# Patient Record
Sex: FEMALE
[De-identification: names, ages, dates, MRNs, and addresses within clinical notes are randomized per-mention and may not be internally consistent; named-entity substitution may affect disease eponyms.]

---

## 2020-03-18 ENCOUNTER — APPOINTMENT (OUTPATIENT)
Dept: ENDOCRINOLOGY | Facility: CLINIC | Age: 60
End: 2020-03-18

## 2020-04-21 PROBLEM — Z00.00 ENCOUNTER FOR PREVENTIVE HEALTH EXAMINATION: Status: ACTIVE | Noted: 2020-04-21

## 2020-05-19 ENCOUNTER — APPOINTMENT (OUTPATIENT)
Dept: ENDOCRINOLOGY | Facility: CLINIC | Age: 60
End: 2020-05-19

## 2020-06-11 ENCOUNTER — APPOINTMENT (OUTPATIENT)
Dept: ENDOCRINOLOGY | Facility: CLINIC | Age: 60
End: 2020-06-11
Payer: MEDICAID

## 2020-06-11 VITALS
HEIGHT: 64 IN | WEIGHT: 196 LBS | HEART RATE: 84 BPM | SYSTOLIC BLOOD PRESSURE: 156 MMHG | BODY MASS INDEX: 33.46 KG/M2 | DIASTOLIC BLOOD PRESSURE: 98 MMHG

## 2020-06-11 DIAGNOSIS — E66.9 OBESITY, UNSPECIFIED: ICD-10-CM

## 2020-06-11 DIAGNOSIS — R79.89 OTHER SPECIFIED ABNORMAL FINDINGS OF BLOOD CHEMISTRY: ICD-10-CM

## 2020-06-11 PROCEDURE — 99205 OFFICE O/P NEW HI 60 MIN: CPT | Mod: 25

## 2020-06-11 PROCEDURE — 36415 COLL VENOUS BLD VENIPUNCTURE: CPT

## 2020-06-11 NOTE — HISTORY OF PRESENT ILLNESS
[FreeTextEntry1] : 60 year female pt, with Hx of thyroid nodules, referred by Dr. Ashley Louise, presents today to establish endocrine care with me.\par Other PMHx: HTN\par Denies head and neck radiation exposure during childhood. \par FHx: CA (mother), MI (father). \par Denies FHx of thyroid disorder. \par SHx: Non smoker. No EtOH use. \par 2 pregnancies; Youngest child is 29 y/o. \par \par 06/11/2020\par Pt visited her PCP for routine check-up who ordered thyroid functioning test and thyroid US, and was then referred to the endocrinologist. \par \par Pt presents today feeling well with no major physical complaints. She is asymptomatic. She walks everyday.\par Denies respiratory obstruction, and swallowing difficulties.\par \par Current Medications: none\par \par Most recent lab studies: \par - 11/1/19: TSH 4.21, Free T4 0.82 (0.89-1.76), Free T3 2.6\par - 11/7/19 Thyroid US: R lobe with a lower pole hypoechoic nodule  measuring 0.8 x 0.4 cm cm, and a hyperechoic nodule measuring 1.2 x 0.9 cm with no calcification and increased vascularity. L lobe with a mid pole hypoechoic nodule measuring 2.1 x 1.1 cm. TI-RADS 3.

## 2020-06-11 NOTE — ADDENDUM
[FreeTextEntry1] : I, Heriberto Martinez, acted solely as a scribe for Dr. Janes Hudson on this date. 06/11/2020.

## 2020-06-11 NOTE — END OF VISIT
[Time Spent: ___ minutes] : I have spent [unfilled] minutes of time on the encounter. [>50% of the face to face encounter time was spent on counseling and/or coordination of care for ___] : Greater than 50% of the face to face encounter time was spent on counseling and/or coordination of care for [unfilled] [FreeTextEntry3] : All medical record entries made by the Scribe were at my, Dr. Janes Hudson, direction and personally dictated by me on 06/11/2020. I have reviewed the chart and agree that the record accurately reflects my personal performance of the history, physical exam, assessment and plan. I have also personally directed, reviewed and agreed with the chart.

## 2020-06-11 NOTE — CONSULT LETTER
[Dear  ___] : Dear  [unfilled], [Consult Letter:] : I had the pleasure of evaluating your patient, [unfilled]. [Consult Closing:] : Thank you very much for allowing me to participate in the care of this patient.  If you have any questions, please do not hesitate to contact me. [Sincerely,] : Sincerely, [FreeTextEntry3] : Janes Hudson

## 2020-06-11 NOTE — PHYSICAL EXAM
[Alert] : alert [Normal Sclera/Conjunctiva] : normal sclera/conjunctiva [No Respiratory Distress] : no respiratory distress [Normal Outer Ear/Nose] : the ears and nose were normal in appearance [Clear to Auscultation] : lungs were clear to auscultation bilaterally [Normal Rate] : heart rate was normal [Regular Rhythm] : with a regular rhythm [Spine Straight] : spine straight [Normal Bowel Sounds] : normal bowel sounds [No Edema] : no peripheral edema [Normal Gait] : normal gait [No Rash] : no rash [Oriented x3] : oriented to person, place, and time [Normal Reflexes] : deep tendon reflexes were 2+ and symmetric [de-identified] : nodularities palpated in the R lobe

## 2020-06-11 NOTE — ASSESSMENT
[Importance of Diet and Exercise] : importance of diet and exercise to improve glycemic control, achieve weight loss and improve cardiovascular health [FreeTextEntry1] : 60 year female with:\par 1. Hx of b/l thyroid nodules found during physical exam last year by her internist:\par Pt is asymptomatic. She has no FHx of thyroid CA, and no Hx of head and neck radiation exposure during childhood. \par Sent in thyroid US, and TFTs (slight decrease in Free T4).\par \par 2. Obesity with BMI 33.64, and HTN with /98\par Pt had a complete metabolic profile last year which was reportedly normal. Will obtain reports. \par Brief overview on diet and lifestyle modification with pt. \par \par Return in: 3 weeks

## 2020-06-13 LAB
T4 FREE SERPL-MCNC: 0.9 NG/DL
THYROGLOB AB SERPL-ACNC: <20 IU/ML
THYROPEROXIDASE AB SERPL IA-ACNC: 1666 IU/ML
TSH SERPL-ACNC: 5.94 UIU/ML

## 2020-06-19 ENCOUNTER — APPOINTMENT (OUTPATIENT)
Dept: ULTRASOUND IMAGING | Facility: CLINIC | Age: 60
End: 2020-06-19
Payer: MEDICAID

## 2020-06-19 ENCOUNTER — RESULT REVIEW (OUTPATIENT)
Age: 60
End: 2020-06-19

## 2020-06-19 ENCOUNTER — OUTPATIENT (OUTPATIENT)
Dept: OUTPATIENT SERVICES | Facility: HOSPITAL | Age: 60
LOS: 1 days | End: 2020-06-19

## 2020-06-19 PROCEDURE — 76536 US EXAM OF HEAD AND NECK: CPT | Mod: 26

## 2020-06-25 ENCOUNTER — APPOINTMENT (OUTPATIENT)
Dept: ENDOCRINOLOGY | Facility: CLINIC | Age: 60
End: 2020-06-25
Payer: MEDICAID

## 2020-06-25 DIAGNOSIS — E04.2 NONTOXIC MULTINODULAR GOITER: ICD-10-CM

## 2020-06-25 DIAGNOSIS — E06.3 AUTOIMMUNE THYROIDITIS: ICD-10-CM

## 2020-06-25 PROCEDURE — 99214 OFFICE O/P EST MOD 30 MIN: CPT | Mod: 95

## 2020-06-26 PROBLEM — E06.3 HASHIMOTO'S THYROIDITIS: Status: ACTIVE | Noted: 2020-06-26

## 2020-06-26 PROBLEM — E04.2 MULTIPLE THYROID NODULES: Status: ACTIVE | Noted: 2020-06-11

## 2020-06-26 NOTE — ASSESSMENT
[Levothyroxine] : The patient was instructed to take Levothyroxine on an empty stomach, separate from vitamins, and wait at least 30 minutes before eating [FreeTextEntry1] : 59 y/o F pt with:\par \par 1. Hx of Hashimoto's thyroiditis:\par TPO 1666 ,t4 free 0.9 and tsh \par Recommend start Levothyroxine 25 mcg qd\par \par \par 2.Recent thyroid US on 6/19/20 shows heterogenous and mildly hypervascular thyroid which can be seen in the setting of Hashimoto's thyroiditis. No discrete thyroid nodules were identified. \par Patient was recommended to have a thyroid FNA biopsy based on thyroid u/s performed on Nov 2019.\par \par \par \par Return in: 3 months

## 2020-06-26 NOTE — REASON FOR VISIT
[Follow - Up] : a follow-up visit [Thyroid nodule/ MNG] : thyroid nodule/ MNG [Weight Management/Obesity] : weight management/obesity

## 2020-06-26 NOTE — END OF VISIT
[Time Spent: ___ minutes] : I have spent [unfilled] minutes of time on the encounter. [FreeTextEntry3] : All medical record entries made by the Scribe were at my, Dr. Janes Hudson, direction and personally dictated by me on 06/25/2020. I have reviewed the chart and agree that the record accurately reflects my personal performance of the history, physical exam, assessment and plan. I have also personally directed, reviewed and agreed with the chart.

## 2020-06-26 NOTE — REVIEW OF SYSTEMS
[Polyuria] : polyuria [Nocturia] : nocturia [Negative] : Heme/Lymph [Recent Weight Loss (___ Lbs)] : no recent weight loss [Constipation] : no constipation [Palpitations] : no palpitations [Diarrhea] : no diarrhea

## 2020-06-26 NOTE — ADDENDUM
[FreeTextEntry1] : I, Heriberto Martinez, acted solely as a scribe for Dr. Janes Hudson on this date. 06/25/2020.

## 2020-06-26 NOTE — HISTORY OF PRESENT ILLNESS
[FreeTextEntry1] : 59 y/o F pt, with Hx of Hashimoto's thyroiditis (dx in 6/2020). Pt was previously dx with b/l thyroid nodules (11/7/19) which are no longer present in recent thyroid US done in 6/2020.  \par Other PMHx: HTN\par Denies head and neck radiation exposure during childhood. \par FHx: CA (mother), MI (father). \par Denies FHx of thyroid disorder. \par 2 pregnancies; Youngest child is 29 y/o. \par \par 06/25/2020 \par Pt did not have any questions prior to the initiation of the telehealth visit.\par Pt presents today via telehealth for thyroid f/u, feeling well with no acute complaints. Pt reports of polyuria and nocturia. She has not made changes to her diet and lifestyle. \par Denies weight loss, palpitations, and GI disturbances. \par \par Current Medications: none\par \par Most recent lab studies: \par - 6/19/20 Thyroid US: Heterogenous and mildly hypervascular thyroid which can be seen in the setting of Grave's disease or Hashimoto's thyroiditis. No discrete thyroid nodules identified. \par - 6/11/20: TSH 5.94, Free T4 0.9, TPO ab 1666, Thyroglobulin ab neg\par \par Previous lab studies:\par - 11/7/19 Thyroid US: R lobe with a lower pole hypoechoic nodule measuring 0.8 x 0.4 cm cm, and a hyperechoic nodule measuring 1.2 x 0.9 cm with no calcification and increased vascularity. L lobe with a mid pole hypoechoic nodule measuring 2.1 x 1.1 cm. TI-RADS 3. \par - 11/1/19: TSH 4.21, Free T4 0.82 (0.89-1.76), Free T3 2.6

## 2020-06-26 NOTE — CONSULT LETTER
[Dear  ___] : Dear  [unfilled], [Consult Letter:] : I had the pleasure of evaluating your patient, [unfilled]. [Consult Closing:] : Thank you very much for allowing me to participate in the care of this patient.  If you have any questions, please do not hesitate to contact me. [FreeTextEntry3] : Tristan Marrero